# Patient Record
Sex: MALE | Race: WHITE | URBAN - METROPOLITAN AREA
[De-identification: names, ages, dates, MRNs, and addresses within clinical notes are randomized per-mention and may not be internally consistent; named-entity substitution may affect disease eponyms.]

---

## 2017-01-11 ENCOUNTER — HISTORICAL (OUTPATIENT)
Dept: RADIOLOGY | Facility: HOSPITAL | Age: 72
End: 2017-01-11

## 2020-04-27 ENCOUNTER — HISTORICAL (OUTPATIENT)
Dept: RESPIRATORY THERAPY | Facility: HOSPITAL | Age: 75
End: 2020-04-27

## 2021-05-07 ENCOUNTER — HISTORICAL (OUTPATIENT)
Dept: RADIOLOGY | Facility: HOSPITAL | Age: 76
End: 2021-05-07

## 2021-05-26 ENCOUNTER — HISTORICAL (OUTPATIENT)
Dept: RADIOLOGY | Facility: HOSPITAL | Age: 76
End: 2021-05-26

## 2021-09-13 LAB
ABS NEUT (OLG): 3.2 X10(3)/MCL (ref 1.5–6.9)
APPEARANCE, UA: CLEAR
APTT PPP: 30.6 SEC (ref 23.4–34.9)
BILIRUB UR QL STRIP: NEGATIVE
BUN SERPL-MCNC: 11 MG/DL (ref 8.4–25.7)
CALCIUM SERPL-MCNC: 9.2 MG/DL (ref 8.8–10)
CHLORIDE SERPL-SCNC: 97 MMOL/L (ref 98–107)
CO2 SERPL-SCNC: 29 MMOL/L (ref 23–31)
COLOR UR: NORMAL
CREAT SERPL-MCNC: 0.78 MG/DL (ref 0.73–1.18)
CREAT/UREA NIT SERPL: 14
CRP SERPL-MCNC: 0.11 MG/DL
ERYTHROCYTE [DISTWIDTH] IN BLOOD BY AUTOMATED COUNT: 13.2 % (ref 11.5–17)
ERYTHROCYTE [SEDIMENTATION RATE] IN BLOOD: 20 MM/HR (ref 0–20)
GLUCOSE (UA): NEGATIVE MG/DL
GLUCOSE SERPL-MCNC: 98 MG/DL (ref 82–115)
GROUP & RH: NORMAL
HCT VFR BLD AUTO: 35.2 % (ref 42–52)
HGB BLD-MCNC: 11.3 GM/DL (ref 14–18)
HGB UR QL STRIP: NEGATIVE
INR PPP: 1 (ref 2–3)
KETONES UR QL STRIP: NEGATIVE MG/DL
LEUKOCYTE ESTERASE UR QL STRIP: NEGATIVE
MCH RBC QN AUTO: 30 PG (ref 27–34)
MCHC RBC AUTO-ENTMCNC: 32 GM/DL (ref 31–36)
MCV RBC AUTO: 95 FL (ref 80–99)
MRSA SCREEN BY PCR: NEGATIVE
NITRITE UR QL STRIP: NEGATIVE
PH UR STRIP: 7 [PH] (ref 4.6–8)
PLATELET # BLD AUTO: 204 X10(3)/MCL (ref 140–400)
PMV BLD AUTO: 9.4 FL (ref 6.8–10)
POTASSIUM SERPL-SCNC: 3.9 MMOL/L (ref 3.5–5.1)
PROT UR QL STRIP: NEGATIVE MG/DL
PROTHROMBIN TIME: 13.5 SEC (ref 11.7–14.5)
RBC # BLD AUTO: 3.71 X10(6)/MCL (ref 4.7–6.1)
SARS-COV-2 AG RESP QL IA.RAPID: NOT DETECTED
SODIUM SERPL-SCNC: 134 MMOL/L (ref 136–145)
SP GR UR STRIP: 1.01 (ref 1–1.03)
UROBILINOGEN UR STRIP-ACNC: 0.2 EU/DL
WBC # SPEC AUTO: 5.2 X10(3)/MCL (ref 4.5–11.5)

## 2021-09-15 ENCOUNTER — HISTORICAL (OUTPATIENT)
Dept: MEDSURG UNIT | Facility: HOSPITAL | Age: 76
End: 2021-09-15

## 2021-09-16 LAB
ABS NEUT (OLG): 6 X10(3)/MCL (ref 1.5–6.9)
ALBUMIN SERPL-MCNC: 3.2 GM/DL (ref 3.4–4.8)
ALBUMIN/GLOB SERPL: 1.2 RATIO (ref 1.1–2)
ALP SERPL-CCNC: 42 UNIT/L (ref 40–150)
ALT SERPL-CCNC: 11 UNIT/L (ref 0–55)
AST SERPL-CCNC: 23 UNIT/L (ref 5–34)
BASOPHILS # BLD AUTO: 0 X10(3)/MCL (ref 0–0.1)
BASOPHILS NFR BLD AUTO: 0 % (ref 0–1)
BILIRUB SERPL-MCNC: 0.4 MG/DL
BILIRUBIN DIRECT+TOT PNL SERPL-MCNC: 0.2 MG/DL (ref 0–0.5)
BILIRUBIN DIRECT+TOT PNL SERPL-MCNC: 0.2 MG/DL (ref 0–0.8)
BUN SERPL-MCNC: 18 MG/DL (ref 8.4–25.7)
CALCIUM SERPL-MCNC: 9.1 MG/DL (ref 8.8–10)
CHLORIDE SERPL-SCNC: 98 MMOL/L (ref 98–107)
CO2 SERPL-SCNC: 25 MMOL/L (ref 23–31)
CREAT SERPL-MCNC: 0.87 MG/DL (ref 0.73–1.18)
EOSINOPHIL # BLD AUTO: 0.2 X10(3)/MCL (ref 0–0.6)
EOSINOPHIL NFR BLD AUTO: 2 % (ref 0–5)
ERYTHROCYTE [DISTWIDTH] IN BLOOD BY AUTOMATED COUNT: 13.1 % (ref 11.5–17)
EST. AVERAGE GLUCOSE BLD GHB EST-MCNC: 91.1 MG/DL
GLOBULIN SER-MCNC: 2.7 GM/DL (ref 2.4–3.5)
GLUCOSE SERPL-MCNC: 111 MG/DL (ref 82–115)
HBA1C MFR BLD: 4.8 %
HCT VFR BLD AUTO: 27.5 % (ref 42–52)
HGB BLD-MCNC: 9 GM/DL (ref 14–18)
IMM GRANULOCYTES # BLD AUTO: 0.03 10*3/UL (ref 0–0.02)
IMM GRANULOCYTES NFR BLD AUTO: 0.4 % (ref 0–0.43)
LYMPHOCYTES # BLD AUTO: 0.8 X10(3)/MCL (ref 0.5–4.1)
LYMPHOCYTES NFR BLD AUTO: 10 % (ref 15–40)
MAGNESIUM SERPL-MCNC: 1.6 MG/DL (ref 1.6–2.6)
MCH RBC QN AUTO: 31 PG (ref 27–34)
MCHC RBC AUTO-ENTMCNC: 33 GM/DL (ref 31–36)
MCV RBC AUTO: 94 FL (ref 80–99)
MONOCYTES # BLD AUTO: 1.1 X10(3)/MCL (ref 0–1.1)
MONOCYTES NFR BLD AUTO: 14 % (ref 4–12)
NEUTROPHILS # BLD AUTO: 6 X10(3)/MCL (ref 1.5–6.9)
NEUTROPHILS NFR BLD AUTO: 74 % (ref 43–75)
PHOSPHATE SERPL-MCNC: 3 MG/DL (ref 2.3–4.7)
PLATELET # BLD AUTO: 148 X10(3)/MCL (ref 140–400)
PMV BLD AUTO: 9.3 FL (ref 6.8–10)
POTASSIUM SERPL-SCNC: 3.9 MMOL/L (ref 3.5–5.1)
PROT SERPL-MCNC: 5.9 GM/DL (ref 5.8–7.6)
RBC # BLD AUTO: 2.91 X10(6)/MCL (ref 4.7–6.1)
SODIUM SERPL-SCNC: 132 MMOL/L (ref 136–145)
TSH SERPL-ACNC: 0.64 UIU/ML (ref 0.35–4.94)
WBC # SPEC AUTO: 8.2 X10(3)/MCL (ref 4.5–11.5)

## 2021-09-17 ENCOUNTER — HISTORICAL (OUTPATIENT)
Dept: ADMINISTRATIVE | Facility: HOSPITAL | Age: 76
End: 2021-09-17

## 2021-09-17 LAB
APPEARANCE, UA: CLEAR
BACTERIA SPEC CULT: ABNORMAL /HPF
BILIRUB UR QL STRIP: NEGATIVE
COLOR UR: YELLOW
GLUCOSE (UA): NEGATIVE MG/DL
HGB UR QL STRIP: ABNORMAL
KETONES UR QL STRIP: NEGATIVE MG/DL
LEUKOCYTE ESTERASE UR QL STRIP: NEGATIVE
NITRITE UR QL STRIP: NEGATIVE
PH UR STRIP: 7 [PH] (ref 4.6–8)
PROT UR QL STRIP: NEGATIVE MG/DL
RBC #/AREA URNS HPF: ABNORMAL /HPF
SP GR UR STRIP: 1.01 (ref 1–1.03)
SQUAMOUS EPITHELIAL, UA: ABNORMAL /LPF
UROBILINOGEN UR STRIP-ACNC: 0.2 EU/DL
WBC #/AREA URNS HPF: ABNORMAL /HPF

## 2021-10-04 ENCOUNTER — HISTORICAL (OUTPATIENT)
Dept: RADIOLOGY | Facility: HOSPITAL | Age: 76
End: 2021-10-04

## 2021-12-13 ENCOUNTER — HISTORICAL (OUTPATIENT)
Dept: RADIOLOGY | Facility: HOSPITAL | Age: 76
End: 2021-12-13

## 2022-04-10 ENCOUNTER — HISTORICAL (OUTPATIENT)
Dept: ADMINISTRATIVE | Facility: HOSPITAL | Age: 77
End: 2022-04-10

## 2022-04-25 VITALS — WEIGHT: 189.63 LBS | HEIGHT: 64 IN | BODY MASS INDEX: 32.37 KG/M2

## 2022-04-30 NOTE — CONSULTS
DATE OF CONSULTATION:      CONSULTING PHYSICIAN:  Christophe Pérez M.D.    REASON FOR CONSULTATION:  Medical management of the patient who has undergone left total knee arthroplasty today under Dr. Torres's care for osteoarthritis of the left knee and I have been consulted for medical management.    HISTORY OF PRESENT ILLNESS:  The patient is a very pleasant 76-year-old male who has been living with his osteoarthritis for bilateral lower extremities one after the other, depending on which one you raise more.  Has seen Dr. Baum and Dr. Coe in the past, but also saw Dr. Torres, and finally he decided to do surgery and he underwent for the crippling osteoarthritis of the left knee total knee arthroplasty today which was done uneventfully and postop the patient is in pain.  He has been given Toradol and later has of a lot of that.  His daughter is present by the side and he does not have any specific complaint.  She wanted to start his other medications like Flomax and Effexor and I did tell her that I am going to start, they have been started and nurse, Ms. Aaron, informed.  The patient did not have any specific complaint other than the pain in the operative limb, which is the left knee.  Besides that, he had no other specific complaint.    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    PAST SURGICAL HISTORY:  Includes cataract extraction on the right side, sinus surgery, as well as cystoscopy, jaw surgery and neck surgery.    FAMILY HISTORY:  Pertinent for mother having diabetes and also having renal failure.    SOCIAL HISTORY:  He does not smoke, does not drink.  He was .  He is a   now.  He has children and he takes care of himself alone.    ALLERGIES:  MORPHINE.      MEDICATIONS:  As follows:  1. Tamsulosin 0.4 mg at bedtime with food.    2. He was taking Percocet 5/325 as needed.   3. Atenolol and chlorthalidone 50/25, 1 tablet in the morning for his hypertension.  4. Diazepam 2 mg at bedtime.  5. Gabapentin 300 mg b.i.d.  6. Metformin 500 mg b.i.d.   7. Simvastatin 10 mg daily.  8. Venlafaxine 150 mg in the morning and 75 mg in the evening.  9. Aspirin 81 mg daily.  10. Sometimes will use occasional Flonase nasal spray and Claritin.    REVIEW OF SYSTEMS:  As mentioned in History of Present Illness.    PHYSICAL EXAMINATION:  GENERAL:  The patient is alert and oriented x3, is in pain from the pain in the operative limb.     VITAL SIGNS:  Pulse is 54, blood pressure 108/68, afebrile.   HEENT:  Head is normocephalic.  Pupils equal size.  Mild conjunctival pallor.  No scleral icterus.  Trachea is midline.   CHEST:  Good bilateral air entry.   CVS:  First and second heart sounds heard normally without any murmur.     ABDOMEN:  Soft, nontender.   EXTREMITIES:  Reveal no edema, cyanosis or clubbing.  The left knee is status post surgery.    LABORATORY AND X-RAY DATA:  Sodium 134, potassium 3.9, chloride 97, bicarb 29, calcium 9.2, glucose 98, BUN 11, creatinine 0.7, GFR more than 60.  PT 13.5, INR 1.0, PTT 30.6.  Hemoglobin 11.3, hematocrit 35.2, WBC 5.2.  Urine unremarkable.  Blood group A negative.  MRSA screen negative.  COVID test negative.  Chest x-ray no cardiopulmonary disease.  EKG shows normal sinus rhythm.    IMPRESSION:    1. Status post total knee arthroscopy of the left knee. Postoperative pain.  2. History of benign prostatic hypertrophy lower urinary tract symptoms.  3. History of anxiety.  4. History of depression.  5. History of diabetes.  6. History of dyslipidemia.  7. History of hypertension.    PLAN:  Continue the present line of treatment.  Follow the surgical recommendations.   DVT prophylaxis as per Dr. Torres's protocol.  We will resume his medications, that is Flomax and Effexor.  Continue the pain medication as per Dr. Rubio's protocol and follow the postop __________.     It will be a pleasure taking care of. Forest Oswald during his stay at [facility] along with Dr. Torres.  I thank Dr. Torres for the consult.        SS/MODL   DD: 09/15/2021 1342   DT: 09/15/2021 1415  Job # [id number]    cc: Delfin Torres M.D.

## 2022-04-30 NOTE — OP NOTE
PREOPERATIVE DIAGNOSIS:  Osteoarthritis, left knee.    POSTOPERATIVE DIAGNOSIS:  Osteoarthritis, left knee.    PROCEDURE:  Left total knee arthroplasty.    ANESTHESIA:  Duramorph spinal.    IMPLANT TYPE:  Sigma posterior stabilized knee size 4 left femur, size 4 tibial tray, size 10 polyethylene insert stabilized plus.  Patella was a 38 round dome.    COMPLICATIONS:  None.    INDICATIONS:  The patient is 76, advanced disease of the knee.  Informed consent was obtained.  Risk of the procedure was explained, not excluding infection, bleeding, pain, scarring, neurovascular injury, DVT, pulmonary embolism, and even death.    PROCEDURE IN DETAIL:  Given IV antibiotics, spinal anesthesia.  Also given tranexamic acid.  Tourniquet inflated.  Anterior incision was made.  Medial arthrotomy was performed.  He had advanced disease of the knee with a severe contracture.  Generous medial release was done.  Medial lateral meniscectomy was performed.  Femur was exposed using IM instrumentation, distal femoral cut was made.  I sized the femur from the top down for a size 4.  Finishing block was secured distally.  Anterior, posterior, chamfer cuts, followed by the box cut was made.  Next, tibia was exposed using IM instrumentation proximal tibial cut was made.  Trialed the construct, size 10 poly was appropriate.  Rotation was marked.  Finishing prep was made on the tibia.  Back of knee was cleaned of meniscal remnants.  Patella was resurfaced to accommodate a 38 patella round dome.  Everything was Pulsavac lavaged and dried.  Cemented all components.  Excess cement was removed from the knee.  Final poly was snapped into position.  Knee was well balanced, tracked well.  Marcaine was injected periarticular.  Wound was Pulsavac irrigated     with light Betadine irrigation and then closed over a drain, interrupted #1, oversewn with a running Stratafix, subcu 2-0, subcuticular, Exofin glue on the skin.  No  complications.        MR/MODL   DD: 09/15/2021 1017   DT: 09/15/2021 1032  Job # [id number]